# Patient Record
Sex: MALE | Race: WHITE | NOT HISPANIC OR LATINO
[De-identification: names, ages, dates, MRNs, and addresses within clinical notes are randomized per-mention and may not be internally consistent; named-entity substitution may affect disease eponyms.]

---

## 2023-12-13 ENCOUNTER — APPOINTMENT (OUTPATIENT)
Dept: ORTHOPEDIC SURGERY | Facility: CLINIC | Age: 58
End: 2023-12-13
Payer: OTHER MISCELLANEOUS

## 2023-12-13 VITALS — HEIGHT: 65 IN | WEIGHT: 170 LBS | BODY MASS INDEX: 28.32 KG/M2

## 2023-12-13 DIAGNOSIS — Z78.9 OTHER SPECIFIED HEALTH STATUS: ICD-10-CM

## 2023-12-13 DIAGNOSIS — Z87.891 PERSONAL HISTORY OF NICOTINE DEPENDENCE: ICD-10-CM

## 2023-12-13 PROBLEM — Z00.00 ENCOUNTER FOR PREVENTIVE HEALTH EXAMINATION: Status: ACTIVE | Noted: 2023-12-13

## 2023-12-13 PROCEDURE — 99244 OFF/OP CNSLTJ NEW/EST MOD 40: CPT

## 2023-12-13 PROCEDURE — 73562 X-RAY EXAM OF KNEE 3: CPT | Mod: RT

## 2023-12-13 NOTE — DISCUSSION/SUMMARY
[de-identified] : Extensive discussion of the options was had with the patient. This discussion included both surgical and nonsurgical options. Options including but not limited to cortisone injection, viscosupplementation,  physical therapy, oral anti-inflammatories, MRI, arthroplasty and arthroscopy were discussed with the patient. We also discussed the option of observation, allowing the patient to continue rest, ice, NSAIDs and following up if the condition does not improve. Time was taken to go over any questions the patient had in regards to any of the treatment plans described.   The Risks, benefits, alternatives and expectations of the proposed procedure, as well as non-operative management, were discussed at length with the patient, as well as the procedure itself and the expected recovery period. The possible need for post operative Physical Therapy was also discussed. The patient was allowed as much time as necessary to ask all appropriate questions and they were answered to the patient's satisfaction. Risks involving the TKA were discussed and include infection, bleeding, anesthesia complications, NV injury, fracture, dislocation, DVT/PE.   A discussion was completed with the patient regarding the type of implant that is planned, including what the implant is made of.  The possibility of allergy was discussed.  The different methods of implant fixation, as well as the expected longevity of the implant was also discussed.  The bone model was used, as well as the [employer] pamphlet.  There was amble time to address all of the patient's questions and concerns.   At this time the patient is indicated for TKA of the right knee  Plan is to go forward with Rt TKA

## 2023-12-13 NOTE — PHYSICAL EXAM
[Right] : right knee [de-identified] : Constitutional: The patient appears well developed, well nourished. Examination of patients ability to communicate functionally was normal.       Neurologic: Coordination is normal. Alert and oriented to time, place and person. No evidence of mood disorder, calm affect.           RIGHT  KNEE  : Inspection of the knee is as follows: moderate effusion. no ecchymosis, no streaking, no erythema, no atrophy, no deformities of the quad tendon and no deformities of patellar tendon.   VARUS ALIGNMENT    Palpation of the knee is as follows: medial joint line tenderness,  medial facet of patella tenderness, lateral facet of patella tenderness and crepitus. no palpable masses and no increased warmth.       Knee Range of Motion is as follows in degrees:        Extension: 7    Flexion: 95        Strength examination of the knee is as follows:    Quadriceps strength is 5/5    Hamstring strength is 5/5       Ligament Stability and Special Test ligamentously stable, negative anterior draw, negative Lachman test, negative posterior draw and no varus or valgus instability.    negative McMurrays test and able to do active straight leg raise without an extensor lag.       Neurological examination of the knee is as follows: light touch is intact throughout.       Gait and function is as follows: mildly antalgic gait.  [FreeTextEntry9] : ap/lat/skiers show medial joint bone on bone contact. 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 severe PF DJD and ACL interference screws in the distal femur and prox tib

## 2023-12-13 NOTE — HISTORY OF PRESENT ILLNESS
[de-identified] : Patient is here today for the right knee pain for yrs. He had injury at work 9/27/2019.  He twisted the knee at work. He has noted severe pain and swelling of the knee as well as lack of ROM. He has tried NSAIDS, PT< CSI and lubricant injections without long term relief. He saw previous ortho who recommended TKA.  he notes swelling of the knee and has issues with stairs.  He had ACL reconstruction 1995 Patellar tendon graft he believes

## 2024-05-07 ENCOUNTER — APPOINTMENT (OUTPATIENT)
Dept: ORTHOPEDIC SURGERY | Facility: HOSPITAL | Age: 59
End: 2024-05-07
Payer: OTHER MISCELLANEOUS

## 2024-05-07 ENCOUNTER — RESULT REVIEW (OUTPATIENT)
Age: 59
End: 2024-05-07

## 2024-05-07 PROCEDURE — 20680 REMOVAL OF IMPLANT DEEP: CPT

## 2024-05-07 PROCEDURE — 27447 TOTAL KNEE ARTHROPLASTY: CPT | Mod: RT

## 2024-05-07 PROCEDURE — 27447 TOTAL KNEE ARTHROPLASTY: CPT | Mod: AS,RT

## 2024-05-07 PROCEDURE — 20985 CPTR-ASST DIR MS PX: CPT | Mod: AS

## 2024-05-07 PROCEDURE — 20985 CPTR-ASST DIR MS PX: CPT

## 2024-05-07 PROCEDURE — 20680 REMOVAL OF IMPLANT DEEP: CPT | Mod: AS

## 2024-05-17 ENCOUNTER — NON-APPOINTMENT (OUTPATIENT)
Age: 59
End: 2024-05-17

## 2024-05-22 ENCOUNTER — APPOINTMENT (OUTPATIENT)
Dept: ORTHOPEDIC SURGERY | Facility: CLINIC | Age: 59
End: 2024-05-22
Payer: OTHER MISCELLANEOUS

## 2024-05-22 ENCOUNTER — RESULT CHARGE (OUTPATIENT)
Age: 59
End: 2024-05-22

## 2024-05-22 PROCEDURE — 99024 POSTOP FOLLOW-UP VISIT: CPT

## 2024-05-22 PROCEDURE — 73560 X-RAY EXAM OF KNEE 1 OR 2: CPT | Mod: RT

## 2024-05-22 NOTE — PHYSICAL EXAM
[de-identified] : Constitutional: The patient appears well developed, well nourished.   Neurologic: Coordination is normal. Alert and oriented to time, place and person. No evidence of mood disorder, calm affect.         RIGHT KNEE: Inspection of the knee is as follows: moderate effusion and small ecchymosis. no streaking, no erythema, no atrophy, no deformities of the quad tendon and no deformities of patellar tendon.   Inspection of the wound reveals clean and dry incision, no fluctuance, no sign of infection, no erythema and no drainage. Mesh/Sutures were removed.  Palpation of the knee is as follows: no increased warmth.  Knee Range of Motion is as follows in degrees:    Extension: 7 soft end point Flexion: 95  Strength examination of the knee is as follows:  Quadriceps strength is 5/5  Hamstring strength is 5/5   Ligament Stability and Special Test ligamentously stable and no varus or valgus instability. patella tracks well and able to do active straight leg raise without an extensor lag.   Neurological examination of the knee is as follows: light touch is intact throughout.   Gait and function is as follows: mildly antalgic gait. CANE   [Right] : right knee [Components well fixed, in good position] : Components well fixed, in good position

## 2024-05-22 NOTE — DISCUSSION/SUMMARY
[de-identified] : Patient will continue the TEDS for 2 more weeks.  He will continue the bone foam and work with PT. He understands he needs to push extension and flexion.  He is not recommended to fly and will f/u with Dr Don in 4 weeks.

## 2024-05-22 NOTE — HISTORY OF PRESENT ILLNESS
[de-identified] : following up for the right knee. Patient is s/p R TKA, AJIT x1 5/07/2024.  Patient states the knee is doing well.  Denies any fever chills or drainage from the knee.  He has been working with PT at home.  He is finished taking his ASA and using the TEDS.  He admits he is using the Bone foam.

## 2024-06-19 ENCOUNTER — APPOINTMENT (OUTPATIENT)
Dept: ORTHOPEDIC SURGERY | Facility: CLINIC | Age: 59
End: 2024-06-19
Payer: OTHER MISCELLANEOUS

## 2024-06-19 VITALS — BODY MASS INDEX: 28.32 KG/M2 | HEIGHT: 65 IN | WEIGHT: 170 LBS

## 2024-06-19 DIAGNOSIS — M17.11 UNILATERAL PRIMARY OSTEOARTHRITIS, RIGHT KNEE: ICD-10-CM

## 2024-06-19 PROCEDURE — 99024 POSTOP FOLLOW-UP VISIT: CPT

## 2024-06-19 RX ORDER — OXYCODONE 5 MG/1
5 TABLET ORAL
Qty: 35 | Refills: 0 | Status: ACTIVE | COMMUNITY
Start: 2024-06-19 | End: 1900-01-01

## 2024-06-19 NOTE — PHYSICAL EXAM
[de-identified] : Constitutional: The patient appears well developed, well nourished.   Neurologic: Coordination is normal. Alert and oriented to time, place and person. No evidence of mood disorder, calm affect.         RIGHT KNEE: Inspection of the knee is as follows: moderate effusion NO  ecchymosis. no streaking, no erythema, no atrophy, no deformities of the quad tendon and no deformities of patellar tendon.   Inspection of the wound WOUND IS HEALING VERY WELL> DIstal wound suture was spitting and removed.   Palpation of the knee is as follows: no increased warmth.  Knee Range of Motion is as follows in degrees:    Extension: 5-7  Flexion: 90  Strength examination of the knee is as follows:  Quadriceps strength is 5/5  Hamstring strength is 5/5   Ligament Stability and Special Test ligamentously stable and no varus or valgus instability. patella tracks well and able to do active straight leg raise without an extensor lag.   Neurological examination of the knee is as follows: light touch is intact throughout.   Gait and function is as follows: mildly antalgic gait.

## 2024-06-19 NOTE — DISCUSSION/SUMMARY
[de-identified] : He will continue PT no restrictions. push ROM.   He is flying to [facility] in a few weeks.  He will start  BID a few days before flying  f/u in 2 mos.

## 2024-06-19 NOTE — HISTORY OF PRESENT ILLNESS
[de-identified] : Follow up for the right knee. Patient is s/p R TKA, AJIT x1 5/07/2024.  patient states he has been working with PT .  He denies any fever chill sor drainage.  He is ambulating without assistive device.  He is limping due to not being able to fully extend the knee.  He is able to tolerate steps without any issues.  Pain is manageable.

## 2024-06-25 ENCOUNTER — NON-APPOINTMENT (OUTPATIENT)
Age: 59
End: 2024-06-25

## 2024-08-05 ENCOUNTER — NON-APPOINTMENT (OUTPATIENT)
Age: 59
End: 2024-08-05

## 2024-08-12 ENCOUNTER — APPOINTMENT (OUTPATIENT)
Dept: ORTHOPEDIC SURGERY | Facility: CLINIC | Age: 59
End: 2024-08-12
Payer: OTHER MISCELLANEOUS

## 2024-08-12 DIAGNOSIS — M17.11 UNILATERAL PRIMARY OSTEOARTHRITIS, RIGHT KNEE: ICD-10-CM

## 2024-08-12 PROCEDURE — 99213 OFFICE O/P EST LOW 20 MIN: CPT

## 2024-08-12 NOTE — PHYSICAL EXAM
[de-identified] : Constitutional: The patient appears well developed, well nourished.   Neurologic: Coordination is normal. Alert and oriented to time, place and person. No evidence of mood disorder, calm affect.         RIGHT KNEE: Inspection of the knee is as follows: moderate effusion NO  ecchymosis. no streaking, no erythema, no atrophy, no deformities of the quad tendon and no deformities of patellar tendon.   Inspection of the wound WOUND IS HEALING VERY WELL>  Palpation of the knee is as follows: no increased warmth.  Knee Range of Motion is as follows in degrees:    Extension: 3-5  Flexion: 90  Strength examination of the knee is as follows:  Quadriceps strength is 5/5  Hamstring strength is 5/5   Ligament Stability and Special Test ligamentously stable and no varus or valgus instability. patella tracks well and able to do active straight leg raise without an extensor lag.   Neurological examination of the knee is as follows: light touch is intact throughout.   Gait and function is as follows: mildly antalgic gait.

## 2024-08-12 NOTE — HISTORY OF PRESENT ILLNESS
[de-identified] : Follow up for the right knee. Patient is s/p R TKA, AJIT x1 5/07/2024.  Patient is currently in PT 3x a week with improvement in strength and ROM.  He is ambulating without assistive device. Patient reports he has been taking oxycodone prior to PT. Patient reports his knee is stiff after PT but he is rarely in pain.

## 2024-08-12 NOTE — DISCUSSION/SUMMARY
[de-identified] : Options were discussed.  he has size 11 poly in place.  Discussed observation, PT, manipulation , poly exchange.  At this time the patient elected to try and push ROM at PT and at home. Explained to patient that they need to push ROM.   f/u 2 months  Entered by Jeremiah Austin acting as scribe. Dr. Don Attestation The documentation recorded by the scribe, in my presence, accurately reflects the service I, Dr. Don, personally performed, and the decisions made by me with my edits as appropriate.

## 2024-10-07 ENCOUNTER — APPOINTMENT (OUTPATIENT)
Dept: ORTHOPEDIC SURGERY | Facility: CLINIC | Age: 59
End: 2024-10-07
Payer: OTHER MISCELLANEOUS

## 2024-10-07 DIAGNOSIS — M17.11 UNILATERAL PRIMARY OSTEOARTHRITIS, RIGHT KNEE: ICD-10-CM

## 2024-10-07 PROCEDURE — 99213 OFFICE O/P EST LOW 20 MIN: CPT

## 2025-01-15 ENCOUNTER — APPOINTMENT (OUTPATIENT)
Dept: ORTHOPEDIC SURGERY | Facility: CLINIC | Age: 60
End: 2025-01-15
Payer: OTHER MISCELLANEOUS

## 2025-01-15 DIAGNOSIS — Z96.651 PRESENCE OF RIGHT ARTIFICIAL KNEE JOINT: ICD-10-CM

## 2025-01-15 PROCEDURE — 99213 OFFICE O/P EST LOW 20 MIN: CPT

## 2025-03-14 ENCOUNTER — NON-APPOINTMENT (OUTPATIENT)
Age: 60
End: 2025-03-14

## 2025-04-30 ENCOUNTER — APPOINTMENT (OUTPATIENT)
Dept: ORTHOPEDIC SURGERY | Facility: CLINIC | Age: 60
End: 2025-04-30
Payer: OTHER MISCELLANEOUS

## 2025-04-30 DIAGNOSIS — M17.11 UNILATERAL PRIMARY OSTEOARTHRITIS, RIGHT KNEE: ICD-10-CM

## 2025-04-30 PROCEDURE — 99213 OFFICE O/P EST LOW 20 MIN: CPT

## 2025-04-30 PROCEDURE — 73560 X-RAY EXAM OF KNEE 1 OR 2: CPT | Mod: RT

## 2025-06-05 ENCOUNTER — NON-APPOINTMENT (OUTPATIENT)
Age: 60
End: 2025-06-05

## 2025-07-03 ENCOUNTER — NON-APPOINTMENT (OUTPATIENT)
Age: 60
End: 2025-07-03